# Patient Record
Sex: FEMALE | Race: WHITE | NOT HISPANIC OR LATINO
[De-identification: names, ages, dates, MRNs, and addresses within clinical notes are randomized per-mention and may not be internally consistent; named-entity substitution may affect disease eponyms.]

---

## 2020-04-09 ENCOUNTER — APPOINTMENT (OUTPATIENT)
Dept: OBGYN | Facility: CLINIC | Age: 25
End: 2020-04-09

## 2020-08-15 ENCOUNTER — TRANSCRIPTION ENCOUNTER (OUTPATIENT)
Age: 25
End: 2020-08-15

## 2020-08-18 ENCOUNTER — FORM ENCOUNTER (OUTPATIENT)
Age: 25
End: 2020-08-18

## 2020-08-19 ENCOUNTER — APPOINTMENT (OUTPATIENT)
Dept: OBGYN | Facility: CLINIC | Age: 25
End: 2020-08-19
Payer: COMMERCIAL

## 2020-08-19 ENCOUNTER — RESULT REVIEW (OUTPATIENT)
Age: 25
End: 2020-08-19

## 2020-08-19 PROCEDURE — 99385 PREV VISIT NEW AGE 18-39: CPT

## 2021-09-30 ENCOUNTER — FORM ENCOUNTER (OUTPATIENT)
Age: 26
End: 2021-09-30

## 2021-10-14 ENCOUNTER — FORM ENCOUNTER (OUTPATIENT)
Age: 26
End: 2021-10-14

## 2021-10-15 DIAGNOSIS — Z80.3 FAMILY HISTORY OF MALIGNANT NEOPLASM OF BREAST: ICD-10-CM

## 2021-10-15 DIAGNOSIS — Z80.0 FAMILY HISTORY OF MALIGNANT NEOPLASM OF DIGESTIVE ORGANS: ICD-10-CM

## 2021-10-15 DIAGNOSIS — Z84.89 FAMILY HISTORY OF OTHER SPECIFIED CONDITIONS: ICD-10-CM

## 2021-10-15 LAB — CYTOLOGY CVX/VAG DOC THIN PREP: NORMAL

## 2021-10-15 RX ORDER — ALBUTEROL 90 MCG
AEROSOL (GRAM) INHALATION
Refills: 0 | Status: ACTIVE | COMMUNITY

## 2021-12-21 ENCOUNTER — APPOINTMENT (OUTPATIENT)
Dept: OBGYN | Facility: CLINIC | Age: 26
End: 2021-12-21
Payer: COMMERCIAL

## 2021-12-21 VITALS
HEIGHT: 68 IN | DIASTOLIC BLOOD PRESSURE: 86 MMHG | BODY MASS INDEX: 20.92 KG/M2 | HEART RATE: 80 BPM | SYSTOLIC BLOOD PRESSURE: 120 MMHG | WEIGHT: 138 LBS

## 2021-12-21 DIAGNOSIS — Z01.419 ENCOUNTER FOR GYNECOLOGICAL EXAMINATION (GENERAL) (ROUTINE) W/OUT ABNORMAL FINDINGS: ICD-10-CM

## 2021-12-21 DIAGNOSIS — J30.2 OTHER SEASONAL ALLERGIC RHINITIS: ICD-10-CM

## 2021-12-21 DIAGNOSIS — L70.9 ACNE, UNSPECIFIED: ICD-10-CM

## 2021-12-21 PROCEDURE — 99395 PREV VISIT EST AGE 18-39: CPT

## 2021-12-21 RX ORDER — BUDESONIDE AND FORMOTEROL FUMARATE DIHYDRATE 160; 4.5 UG/1; UG/1
AEROSOL RESPIRATORY (INHALATION)
Refills: 0 | Status: DISCONTINUED | COMMUNITY
End: 2021-12-21

## 2021-12-21 RX ORDER — FAMOTIDINE 20 MG/1
20 TABLET, FILM COATED ORAL
Qty: 30 | Refills: 0 | Status: ACTIVE | COMMUNITY
Start: 2021-08-09

## 2021-12-21 RX ORDER — FLUTICASONE PROPIONATE AND SALMETEROL 50; 100 UG/1; UG/1
100-50 POWDER RESPIRATORY (INHALATION)
Qty: 60 | Refills: 0 | Status: ACTIVE | COMMUNITY
Start: 2021-06-28

## 2021-12-21 RX ORDER — CETIRIZINE HYDROCHLORIDE 10 MG/1
10 TABLET, COATED ORAL
Qty: 30 | Refills: 0 | Status: ACTIVE | COMMUNITY
Start: 2021-06-25

## 2021-12-21 RX ORDER — CLINDAMYCIN PHOSPHATE 1 G/10ML
1 GEL TOPICAL
Qty: 30 | Refills: 0 | Status: ACTIVE | COMMUNITY
Start: 2021-07-01

## 2021-12-21 RX ORDER — BENZOYL PEROXIDE 5 G/100G
5 LIQUID TOPICAL
Qty: 148 | Refills: 0 | Status: ACTIVE | COMMUNITY
Start: 2021-07-01

## 2021-12-23 LAB — HPV HIGH+LOW RISK DNA PNL CVX: NOT DETECTED

## 2021-12-24 NOTE — COUNSELING
[Nutrition/ Exercise/ Weight Management] : nutrition, exercise, weight management [Vitamins/Supplements] : vitamins/supplements [Breast Self Exam] : breast self exam [Contraception/ Emergency Contraception/ Safe Sexual Practices] : contraception, emergency contraception, safe sexual practices [Confidentiality] : confidentiality [Lab Results] : lab results [Other ___] : [unfilled]

## 2021-12-24 NOTE — HISTORY OF PRESENT ILLNESS
[Patient reported PAP Smear was normal] : Patient reported PAP Smear was normal [No] : No [Never active] : never active [Patient refuses STI testing] : Patient refuses STI testing [FreeTextEntry1] : JAMARCUS NÚÑEZ is a 26 year old G0 LMP 12/14/2021 who presents for annual wellness visit. Patient is doing well and was last seen by Dr. Cruz 8/2020. \par \par Pt reports taking Microgestin and has regular periods. Pt denies ever being sexually active.  \par \par Pt reports having asthma and it is managed by PCP. Her asthma is exacerbated when sick. She uses Advair everyday and has a rescue albuterol inhaler when she has shortness of breath (~every 2 weeks).\par \par Pt reports that her mother has Breast Cancer, but was BRCA negative. Her mother also has recurring meningioma.  \par Pt reports that her second cousins had ovarian cancer when they were ~39 YO. 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 \par  [Y] : Patient uses contraception [N] : Patient denies prior pregnancies [PapSmeardate] : 8/19/2020 [PGHxTotal] : 0

## 2021-12-24 NOTE — PLAN
[FreeTextEntry1] : Routine GYN\par - PAP Done\par - BSE\par - Breast Sono Rx baseline screening age 30\par - Discussed genetic testing due to FamHx of cancer; information on COLOR testing given; call to make appt with EZEQUIEL/JR if interested. \par - RTO 1 year 
No

## 2021-12-24 NOTE — PHYSICAL EXAM
[Appropriately responsive] : appropriately responsive [Alert] : alert [No Acute Distress] : no acute distress [Soft] : soft [Non-tender] : non-tender [Non-distended] : non-distended [No HSM] : No HSM [No Lesions] : no lesions [No Mass] : no mass [Oriented x3] : oriented x3 [Examination Of The Breasts] : a normal appearance [No Masses] : no breast masses were palpable [Labia Majora] : normal [Labia Minora] : normal [Normal] : normal [Uterine Adnexae] : normal [Chaperone Declined] : Patient declined chaperone [Declined] : Patient declined rectal exam

## 2021-12-24 NOTE — PROCEDURE
[Cervical Pap Smear] : cervical Pap smear [Liquid Base] : liquid base [No Complications] : there were no complications [de-identified] : dfficult exam; pt very tense; peds spec used

## 2021-12-29 LAB — CYTOLOGY CVX/VAG DOC THIN PREP: NORMAL

## 2022-01-04 ENCOUNTER — TRANSCRIPTION ENCOUNTER (OUTPATIENT)
Age: 27
End: 2022-01-04

## 2022-02-02 ENCOUNTER — NON-APPOINTMENT (OUTPATIENT)
Age: 27
End: 2022-02-02

## 2022-02-05 ENCOUNTER — APPOINTMENT (OUTPATIENT)
Age: 27
End: 2022-02-05
Payer: COMMERCIAL

## 2022-02-05 VITALS
SYSTOLIC BLOOD PRESSURE: 112 MMHG | RESPIRATION RATE: 14 BRPM | OXYGEN SATURATION: 99 % | HEART RATE: 89 BPM | DIASTOLIC BLOOD PRESSURE: 70 MMHG | HEIGHT: 68 IN | BODY MASS INDEX: 20.46 KG/M2 | WEIGHT: 135 LBS

## 2022-02-05 DIAGNOSIS — Z87.09 PERSONAL HISTORY OF OTHER DISEASES OF THE RESPIRATORY SYSTEM: ICD-10-CM

## 2022-02-05 DIAGNOSIS — R06.02 SHORTNESS OF BREATH: ICD-10-CM

## 2022-02-05 DIAGNOSIS — J45.909 UNSPECIFIED ASTHMA, UNCOMPLICATED: ICD-10-CM

## 2022-02-05 PROCEDURE — 99203 OFFICE O/P NEW LOW 30 MIN: CPT

## 2022-02-05 RX ORDER — FLUTICASONE PROPIONATE AND SALMETEROL 250; 50 UG/1; UG/1
250-50 POWDER RESPIRATORY (INHALATION)
Qty: 1 | Refills: 3 | Status: ACTIVE | COMMUNITY
Start: 2022-02-05 | End: 1900-01-01

## 2022-02-05 NOTE — DISCUSSION/SUMMARY
[FreeTextEntry1] : SOB POST COVID BETTER/ ASTHMA EXACERBATION\par INCREASE ADVAIR 250\par CXR\par BLOOD TEST\par TO FOLLOW WITH HER PUL

## 2022-02-05 NOTE — HISTORY OF PRESENT ILLNESS
[Shortness of Breath] : Shortness of Breath [TextBox_4] : HAD COVID 1.20/22 SINCE SOB ON EXERTION, BUT BETTER, HX OF ASTHMA ON ADVAIR 100/50 PRIOR OCC RESCUE INHALERS, NOW DAILY RESCUE INHALERS, NON SMOKER

## 2022-06-16 ENCOUNTER — RX RENEWAL (OUTPATIENT)
Age: 27
End: 2022-06-16

## 2022-06-16 RX ORDER — FLUTICASONE PROPIONATE AND SALMETEROL 50; 250 UG/1; UG/1
250-50 POWDER RESPIRATORY (INHALATION)
Qty: 60 | Refills: 0 | Status: ACTIVE | COMMUNITY
Start: 2022-06-16 | End: 1900-01-01

## 2022-07-19 DIAGNOSIS — N92.3 OVULATION BLEEDING: ICD-10-CM

## 2022-12-16 ENCOUNTER — TRANSCRIPTION ENCOUNTER (OUTPATIENT)
Age: 27
End: 2022-12-16

## 2022-12-22 ENCOUNTER — APPOINTMENT (OUTPATIENT)
Dept: OBGYN | Facility: CLINIC | Age: 27
End: 2022-12-22

## 2022-12-22 VITALS
BODY MASS INDEX: 18.94 KG/M2 | HEIGHT: 68 IN | SYSTOLIC BLOOD PRESSURE: 117 MMHG | DIASTOLIC BLOOD PRESSURE: 64 MMHG | WEIGHT: 125 LBS

## 2022-12-22 PROCEDURE — 99395 PREV VISIT EST AGE 18-39: CPT

## 2022-12-22 RX ORDER — NORETHINDRONE ACETATE/ETHINYL ESTRADIOL AND FERROUS FUMARATE 1.5-30(21)
1.5-3 KIT ORAL
Qty: 3 | Refills: 3 | Status: DISCONTINUED | COMMUNITY
Start: 2022-07-19 | End: 2022-12-22

## 2022-12-22 RX ORDER — NORETHINDRONE ACETATE/ETHINYL ESTRADIOL AND FERROUS FUMARATE 1MG-20(21)
1-20 KIT ORAL
Qty: 3 | Refills: 2 | Status: DISCONTINUED | COMMUNITY
Start: 2021-12-21 | End: 2022-12-22

## 2022-12-22 NOTE — PLAN
[FreeTextEntry1] : Routine GYN\par -BSE reviewed\par -baseline tiffanie/sono age 35\par -no pap today (normal pap 12/2021), s/p hpv vaccine, never sexually active\par -Discussed changing ocp from Microgestin to SUSAN for acne/mood. trial 3 months, if she would like to cintune erx for full yr. b/r/se reviewed with pt. Oral contraceptive counseling provided to the patient.  Discussed risks and benefits, including thromboembolic events, especially in the setting of smoking, or in the setting of pre-existing medical conditions that predispose to adverse cardiovascular events.  Benign side effects reviewed as well as risk of unintended pregnancy.   Discussion regarding decreased contraceptive efficacy when taken with certain medications or when dosing schedule is erratic.  They do not protect against STI's. All questions answered.\par \par RTO for annual care or sooner as needed.

## 2022-12-22 NOTE — COUNSELING
[Vitamins/Supplements] : vitamins/supplements [Breast Self Exam] : breast self exam [Contraception/ Emergency Contraception/ Safe Sexual Practices] : contraception, emergency contraception, safe sexual practices [Vaccines] : vaccines [HPV Vaccine] : HPV Vaccine [Lab Results] : lab results

## 2023-06-12 ENCOUNTER — RX RENEWAL (OUTPATIENT)
Age: 28
End: 2023-06-12

## 2023-06-12 RX ORDER — ALBUTEROL SULFATE 90 UG/1
108 (90 BASE) INHALANT RESPIRATORY (INHALATION) EVERY 4 HOURS
Qty: 8.5 | Refills: 0 | Status: ACTIVE | COMMUNITY
Start: 2022-02-05 | End: 1900-01-01

## 2023-10-21 ENCOUNTER — RX RENEWAL (OUTPATIENT)
Age: 28
End: 2023-10-21

## 2023-10-24 ENCOUNTER — TRANSCRIPTION ENCOUNTER (OUTPATIENT)
Age: 28
End: 2023-10-24

## 2023-12-27 ENCOUNTER — APPOINTMENT (OUTPATIENT)
Dept: OBGYN | Facility: CLINIC | Age: 28
End: 2023-12-27
Payer: COMMERCIAL

## 2023-12-27 VITALS
HEIGHT: 68 IN | DIASTOLIC BLOOD PRESSURE: 68 MMHG | WEIGHT: 130 LBS | SYSTOLIC BLOOD PRESSURE: 107 MMHG | BODY MASS INDEX: 19.7 KG/M2

## 2023-12-27 DIAGNOSIS — Z11.3 ENCOUNTER FOR SCREENING FOR INFECTIONS WITH A PREDOMINANTLY SEXUAL MODE OF TRANSMISSION: ICD-10-CM

## 2023-12-27 DIAGNOSIS — Z12.4 ENCOUNTER FOR SCREENING FOR MALIGNANT NEOPLASM OF CERVIX: ICD-10-CM

## 2023-12-27 DIAGNOSIS — Z30.41 ENCOUNTER FOR SURVEILLANCE OF CONTRACEPTIVE PILLS: ICD-10-CM

## 2023-12-27 DIAGNOSIS — Z11.51 ENCOUNTER FOR SCREENING FOR HUMAN PAPILLOMAVIRUS (HPV): ICD-10-CM

## 2023-12-27 PROCEDURE — 36415 COLL VENOUS BLD VENIPUNCTURE: CPT

## 2023-12-27 PROCEDURE — 99395 PREV VISIT EST AGE 18-39: CPT

## 2023-12-27 RX ORDER — DROSPIRENONE AND ETHINYL ESTRADIOL 0.02-3(28)
3-0.02 KIT ORAL DAILY
Qty: 3 | Refills: 3 | Status: ACTIVE | COMMUNITY
Start: 2022-12-22 | End: 1900-01-01

## 2023-12-27 NOTE — PHYSICAL EXAM
[Chaperone Declined] : Patient declined chaperone [Appropriately responsive] : appropriately responsive [Alert] : alert [No Acute Distress] : no acute distress [No Lymphadenopathy] : no lymphadenopathy [Soft] : soft [Non-tender] : non-tender [Non-distended] : non-distended [No HSM] : No HSM [No Lesions] : no lesions [No Mass] : no mass [Oriented x3] : oriented x3 [Examination Of The Breasts] : a normal appearance [No Masses] : no breast masses were palpable [Labia Majora] : normal [Labia Minora] : normal [Old Blood] : old blood was present in the vagina [Normal] : normal [Uterine Adnexae] : normal

## 2023-12-27 NOTE — PLAN
[FreeTextEntry1] : Routinee GYN -BSE -pap done -STI screening done -refilled ocp, Oral contraceptive counseling provided to the patient.  Discussed risks and benefits, including thromboembolic events, especially in the setting of smoking, or in the setting of pre-existing medical conditions that predispose to adverse cardiovascular events.  Benign side effects reviewed as well as risk of unintended pregnancy.   Discussion regarding decreased contraceptive efficacy when taken with certain medications or when dosing schedule is erratic.  They do not protect against STI's. All questions answered.  rto 1 yr or sooner as needed.

## 2023-12-27 NOTE — HISTORY OF PRESENT ILLNESS
[Patient reported PAP Smear was normal] : Patient reported PAP Smear was normal [FreeTextEntry1] :  29 y/o G0 LMP 12/18/23  presents for annual routine GYN exam. Now has boyfriend, sexually active, male partner, using condoms. . Doing well on Microgestin 1.5/30. s/p Gardasil series.   Pt reports having asthma and it is managed by PCP. Her asthma is exacerbated when sick. She uses Advair everyday and has a rescue albuterol inhaler when she has shortness of breath (~every 2 weeks).  Pt reports that her mother has Breast Cancer, but was BRCA negative. Her mother also has recurring meningioma.   Pt reports that her second cousins had ovarian cancer when they were ~39 YO.   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   [PapSmeardate] : 12/21/21 [Yes] : Yes [Condoms] : Condoms [Patient would like to be screened for STIs] : Patient would like to be screened for STIs

## 2023-12-27 NOTE — COUNSELING
[Nutrition/ Exercise/ Weight Management] : nutrition, exercise, weight management [Vitamins/Supplements] : vitamins/supplements [Drugs/Alcohol] : drugs, alcohol [Breast Self Exam] : breast self exam [Contraception/ Emergency Contraception/ Safe Sexual Practices] : contraception, emergency contraception, safe sexual practices [Confidentiality] : confidentiality [STD (testing, results, tx)] : STD (testing, results, tx) [HPV Vaccine] : HPV Vaccine [Lab Results] : lab results [Medication Management] : medication management

## 2023-12-28 LAB
C TRACH RRNA SPEC QL NAA+PROBE: NOT DETECTED
HBV SURFACE AG SER QL: NONREACTIVE
HCV AB SER QL: NONREACTIVE
HCV S/CO RATIO: 0.11 S/CO
HIV1+2 AB SPEC QL IA.RAPID: NONREACTIVE
N GONORRHOEA RRNA SPEC QL NAA+PROBE: NOT DETECTED
SOURCE TP AMPLIFICATION: NORMAL
T PALLIDUM AB SER QL IA: NEGATIVE

## 2024-01-02 LAB — CYTOLOGY CVX/VAG DOC THIN PREP: NORMAL

## 2024-02-26 ENCOUNTER — NON-APPOINTMENT (OUTPATIENT)
Age: 29
End: 2024-02-26

## 2024-02-27 ENCOUNTER — APPOINTMENT (OUTPATIENT)
Dept: GASTROENTEROLOGY | Facility: CLINIC | Age: 29
End: 2024-02-27
Payer: COMMERCIAL

## 2024-02-27 VITALS
HEART RATE: 88 BPM | WEIGHT: 130 LBS | SYSTOLIC BLOOD PRESSURE: 112 MMHG | OXYGEN SATURATION: 99 % | HEIGHT: 68 IN | RESPIRATION RATE: 14 BRPM | DIASTOLIC BLOOD PRESSURE: 64 MMHG | TEMPERATURE: 98 F | BODY MASS INDEX: 19.7 KG/M2

## 2024-02-27 DIAGNOSIS — Z87.891 PERSONAL HISTORY OF NICOTINE DEPENDENCE: ICD-10-CM

## 2024-02-27 PROCEDURE — 99204 OFFICE O/P NEW MOD 45 MIN: CPT

## 2024-02-28 RX ORDER — FLUTICASONE FUROATE, UMECLIDINIUM BROMIDE AND VILANTEROL TRIFENATATE 100; 62.5; 25 UG/1; UG/1; UG/1
100-62.5-25 POWDER RESPIRATORY (INHALATION)
Refills: 0 | Status: ACTIVE | COMMUNITY

## 2024-02-28 NOTE — HISTORY OF PRESENT ILLNESS
[FreeTextEntry1] : She is a 28 year old female with a history of heartburn which is worse recently. She started taking famotidine 20 mg daily with mild improvement in her symptoms. She denies abdominal pain or NSAID use. She denies difficulty in swallowing. She has a family history of esophageal cancer.

## 2024-02-28 NOTE — CONSULT LETTER
[Dear  ___] : Dear  [unfilled], [Consult Letter:] : I had the pleasure of evaluating your patient, [unfilled]. [( Thank you for referring [unfilled] for consultation for _____ )] : Thank you for referring [unfilled] for consultation for [unfilled] [Please see my note below.] : Please see my note below. [Consult Closing:] : Thank you very much for allowing me to participate in the care of this patient.  If you have any questions, please do not hesitate to contact me. [Sincerely,] : Sincerely, [FreeTextEntry3] : Morales Lewis MD  Gastroenterology [facility]

## 2024-02-28 NOTE — ASSESSMENT
[FreeTextEntry1] : JAMARCUS NÚÑEZ was advised to undergo endoscopy to which she agreed. The procedure will be performed in [facility] with the assistance of an anesthesiologist. She was given a booklet distributed by the [employer] Society of Gastro[employer] End[employer] explaining the procedure in detail and she understood the risks of the procedure not limited to infection, bleeding, perforation or non- diagnosis of gastric or esophageal cancer.  She was advised that she could not drive home, if she chooses to receive sedation. Further diagnostic and treatment recommendations will be based upon the procedure and any biopsies, if they are taken. Thank you for allowing me to participate in this [facility] care.  Increase famotidine to 20 mg BID

## 2024-03-06 ENCOUNTER — APPOINTMENT (OUTPATIENT)
Dept: GASTROENTEROLOGY | Facility: AMBULATORY SURGERY CENTER | Age: 29
End: 2024-03-06
Payer: COMMERCIAL

## 2024-03-06 ENCOUNTER — RESULT REVIEW (OUTPATIENT)
Age: 29
End: 2024-03-06

## 2024-03-06 PROCEDURE — 43239 EGD BIOPSY SINGLE/MULTIPLE: CPT

## 2024-03-06 RX ORDER — PANTOPRAZOLE 40 MG/1
40 TABLET, DELAYED RELEASE ORAL
Qty: 30 | Refills: 4 | Status: ACTIVE | COMMUNITY
Start: 2024-03-06 | End: 1900-01-01

## 2024-03-27 ENCOUNTER — APPOINTMENT (OUTPATIENT)
Dept: GASTROENTEROLOGY | Facility: CLINIC | Age: 29
End: 2024-03-27
Payer: COMMERCIAL

## 2024-03-27 VITALS
HEIGHT: 68 IN | HEART RATE: 80 BPM | RESPIRATION RATE: 14 BRPM | BODY MASS INDEX: 19.55 KG/M2 | WEIGHT: 129 LBS | SYSTOLIC BLOOD PRESSURE: 110 MMHG | DIASTOLIC BLOOD PRESSURE: 70 MMHG | TEMPERATURE: 98.2 F | OXYGEN SATURATION: 99 %

## 2024-03-27 PROCEDURE — 99214 OFFICE O/P EST MOD 30 MIN: CPT

## 2024-03-27 NOTE — CONSULT LETTER
[Dear  ___] : Dear  [unfilled], [Consult Letter:] : I had the pleasure of evaluating your patient, [unfilled]. [( Thank you for referring [unfilled] for consultation for _____ )] : Thank you for referring [unfilled] for consultation for [unfilled] [Please see my note below.] : Please see my note below. [Consult Closing:] : Thank you very much for allowing me to participate in the care of this patient.  If you have any questions, please do not hesitate to contact me. [Sincerely,] : Sincerely, [FreeTextEntry3] : Lb Lewis MD  Gastroenterology [facility], [place]

## 2024-03-27 NOTE — HISTORY OF PRESENT ILLNESS
[FreeTextEntry1] : Her recent endoscopy revealed normal mucosa and a small hiatal hernia.  She was placed on pantoprazole 40 mg in the morning which completely resolved her heartburn until she got COVID 2 weeks ago , then her heartburn got worse with a bubbling feeling in her chest.  Routine biopsies of the stomach were negative for Helicobacter pylori.

## 2024-03-27 NOTE — ASSESSMENT
[FreeTextEntry1] : I advised her to continue taking pantoprazole I also told her to take Mylanta or Maalox 2 tablespoons after meals when she feels this bubbly feeling. She is going away on vacation tomorrow and if her symptoms worsen then I advised  her to double the dose of pantoprazole to twice a day for  one week.  Office follow up in  2 months   I spent 31 minutes with the patient and answered all of her questions

## 2024-06-11 ENCOUNTER — TRANSCRIPTION ENCOUNTER (OUTPATIENT)
Age: 29
End: 2024-06-11

## 2024-06-11 DIAGNOSIS — B37.9 CANDIDIASIS, UNSPECIFIED: ICD-10-CM

## 2024-06-11 RX ORDER — FLUCONAZOLE 100 MG/1
100 TABLET ORAL
Qty: 2 | Refills: 0 | Status: ACTIVE | COMMUNITY
Start: 2024-06-11 | End: 1900-01-01

## 2024-06-27 ENCOUNTER — APPOINTMENT (OUTPATIENT)
Dept: GASTROENTEROLOGY | Facility: CLINIC | Age: 29
End: 2024-06-27
Payer: COMMERCIAL

## 2024-06-27 VITALS
OXYGEN SATURATION: 99 % | HEIGHT: 68 IN | RESPIRATION RATE: 14 BRPM | SYSTOLIC BLOOD PRESSURE: 108 MMHG | DIASTOLIC BLOOD PRESSURE: 70 MMHG | TEMPERATURE: 97 F | HEART RATE: 85 BPM | WEIGHT: 133 LBS | BODY MASS INDEX: 20.16 KG/M2

## 2024-06-27 DIAGNOSIS — R12 HEARTBURN: ICD-10-CM

## 2024-06-27 DIAGNOSIS — K21.9 GASTRO-ESOPHAGEAL REFLUX DISEASE W/OUT ESOPHAGITIS: ICD-10-CM

## 2024-06-27 PROCEDURE — 99213 OFFICE O/P EST LOW 20 MIN: CPT

## 2024-07-30 PROBLEM — N92.0 INTERMENSTRUAL SPOTTING: Status: ACTIVE | Noted: 2022-07-19

## 2024-09-11 ENCOUNTER — TRANSCRIPTION ENCOUNTER (OUTPATIENT)
Age: 29
End: 2024-09-11

## 2024-09-16 ENCOUNTER — TRANSCRIPTION ENCOUNTER (OUTPATIENT)
Age: 29
End: 2024-09-16

## 2024-10-29 ENCOUNTER — APPOINTMENT (OUTPATIENT)
Dept: GASTROENTEROLOGY | Facility: CLINIC | Age: 29
End: 2024-10-29

## 2024-11-18 ENCOUNTER — TRANSCRIPTION ENCOUNTER (OUTPATIENT)
Age: 29
End: 2024-11-18

## 2024-12-09 ENCOUNTER — RX RENEWAL (OUTPATIENT)
Age: 29
End: 2024-12-09

## 2024-12-09 RX ORDER — DROSPIRENONE AND ETHINYL ESTRADIOL 0.02-3(28)
3-0.02 KIT ORAL
Qty: 84 | Refills: 0 | Status: ACTIVE | COMMUNITY
Start: 2024-12-09 | End: 1900-01-01

## 2024-12-30 ENCOUNTER — APPOINTMENT (OUTPATIENT)
Dept: OBGYN | Facility: CLINIC | Age: 29
End: 2024-12-30
Payer: COMMERCIAL

## 2024-12-30 ENCOUNTER — NON-APPOINTMENT (OUTPATIENT)
Age: 29
End: 2024-12-30

## 2024-12-30 VITALS
HEIGHT: 68 IN | BODY MASS INDEX: 19.55 KG/M2 | SYSTOLIC BLOOD PRESSURE: 125 MMHG | DIASTOLIC BLOOD PRESSURE: 75 MMHG | WEIGHT: 129 LBS

## 2024-12-30 DIAGNOSIS — Z11.3 ENCOUNTER FOR SCREENING FOR INFECTIONS WITH A PREDOMINANTLY SEXUAL MODE OF TRANSMISSION: ICD-10-CM

## 2024-12-30 DIAGNOSIS — Z30.41 ENCOUNTER FOR SURVEILLANCE OF CONTRACEPTIVE PILLS: ICD-10-CM

## 2024-12-30 DIAGNOSIS — Z12.4 ENCOUNTER FOR SCREENING FOR MALIGNANT NEOPLASM OF CERVIX: ICD-10-CM

## 2024-12-30 PROCEDURE — 99395 PREV VISIT EST AGE 18-39: CPT

## 2024-12-30 PROCEDURE — 36415 COLL VENOUS BLD VENIPUNCTURE: CPT

## 2024-12-31 LAB
C TRACH RRNA SPEC QL NAA+PROBE: NOT DETECTED
N GONORRHOEA RRNA SPEC QL NAA+PROBE: NOT DETECTED
SOURCE TP AMPLIFICATION: NORMAL

## 2025-01-02 LAB
HBV SURFACE AG SER QL: NONREACTIVE
HCV AB SER QL: NONREACTIVE
HCV S/CO RATIO: 0.1 S/CO
HIV1+2 AB SPEC QL IA.RAPID: NONREACTIVE
T PALLIDUM AB SER QL IA: NEGATIVE

## 2025-01-06 ENCOUNTER — TRANSCRIPTION ENCOUNTER (OUTPATIENT)
Age: 30
End: 2025-01-06

## 2025-01-07 ENCOUNTER — TRANSCRIPTION ENCOUNTER (OUTPATIENT)
Age: 30
End: 2025-01-07

## 2025-01-09 ENCOUNTER — RX RENEWAL (OUTPATIENT)
Age: 30
End: 2025-01-09

## 2025-01-31 ENCOUNTER — APPOINTMENT (OUTPATIENT)
Dept: OBGYN | Facility: CLINIC | Age: 30
End: 2025-01-31
Payer: COMMERCIAL

## 2025-01-31 VITALS
HEIGHT: 55 IN | BODY MASS INDEX: 29.85 KG/M2 | DIASTOLIC BLOOD PRESSURE: 87 MMHG | WEIGHT: 129 LBS | HEART RATE: 88 BPM | SYSTOLIC BLOOD PRESSURE: 113 MMHG

## 2025-01-31 DIAGNOSIS — R87.612 LOW GRADE SQUAMOUS INTRAEPITHELIAL LESION ON CYTOLOGIC SMEAR OF CERVIX (LGSIL): ICD-10-CM

## 2025-01-31 PROCEDURE — 81025 URINE PREGNANCY TEST: CPT

## 2025-01-31 PROCEDURE — 57454 BX/CURETT OF CERVIX W/SCOPE: CPT

## 2025-02-04 ENCOUNTER — TRANSCRIPTION ENCOUNTER (OUTPATIENT)
Age: 30
End: 2025-02-04

## 2025-02-04 LAB — CORE LAB BIOPSY: NORMAL

## 2025-02-05 ENCOUNTER — TRANSCRIPTION ENCOUNTER (OUTPATIENT)
Age: 30
End: 2025-02-05

## 2025-02-11 RX ORDER — DROSPIRENONE AND ETHINYL ESTRADIOL 0.03MG-3MG
3-0.03 KIT ORAL
Qty: 84 | Refills: 0 | Status: ACTIVE | COMMUNITY
Start: 2025-02-11 | End: 1900-01-01